# Patient Record
(demographics unavailable — no encounter records)

---

## 2025-03-10 NOTE — PHYSICAL EXAM
[Chaperone Present] : A chaperone was present in the examining room during all aspects of the physical examination [Normal] : normal [IUD String] : an IUD string was noted [FreeTextEntry2] : JAQUELINE Wilcox

## 2025-03-10 NOTE — HISTORY OF PRESENT ILLNESS
[IUD] : has an intrauterine device [Y] : Patient is sexually active [Monogamous (Male Partner)] : is monogamous with a male partner [FreeTextEntry1] : PATIENT PRESENT FOR IUD CHECK. [TextBox_4] : Pt present for IUD placement check [Mammogramdate] : 0 [BreastSonogramDate] : 0 [PapSmeardate] : 7/3/24 [BoneDensityDate] : 0 [ColonoscopyDate] : 0 [LMPDate] : 2/12/25 [[facility]] : [facility]

## 2025-03-10 NOTE — PLAN
[FreeTextEntry1] : IUD string visualized at cervical os; will have sono at next visit in July, 2025 D/W Dr Myers

## 2025-07-25 NOTE — COUNSELING
[Nutrition/ Exercise/ Weight Management] : nutrition, exercise, weight management [Vitamins/Supplements] : vitamins/supplements [Contraception/ Emergency Contraception/ Safe Sexual Practices] : contraception, emergency contraception, safe sexual practices [Yes] : Risk of tobacco use and health benefits of smoking cessation discussed: Yes

## 2025-07-25 NOTE — PHYSICAL EXAM
[Chaperoned Physical Exam] : A chaperone was present in the examining room during all aspects of the physical examination. [MA] : MA [FreeTextEntry1] : Joanna [Appropriately responsive] : appropriately responsive [Alert] : alert [No Acute Distress] : no acute distress [No Lymphadenopathy] : no lymphadenopathy [Regular Rate Rhythm] : regular rate rhythm [No Murmurs] : no murmurs [Clear to Auscultation B/L] : clear to auscultation bilaterally [Soft] : soft [Non-tender] : non-tender [Non-distended] : non-distended [No HSM] : No HSM [No Lesions] : no lesions [No Mass] : no mass [Oriented x3] : oriented x3 [Examination Of The Breasts] : a normal appearance [No Masses] : no breast masses were palpable [Labia Majora] : normal [Labia Minora] : normal [IUD String] : an IUD string was noted [Normal] : normal [Uterine Adnexae] : normal

## 2025-07-25 NOTE — PLAN
[FreeTextEntry1] : ANNUAL  PAP HPV GC CHL IUD IN PLACE  VITAMINS QD C/D/E HEALTHY DIET D/W/P RTO 12M

## 2025-07-25 NOTE — HISTORY OF PRESENT ILLNESS
[N] : Patient reports normal menses [Y] : Positive pregnancy history [FreeTextEntry1] : PATIENT PRESENT FOR ANNUAL EXAM/ IUD CHECK. [TextBox_4] : 32 year old female pt present for annual exam. Surgeries include knee surgery and hernia repair.  X 1. Family h/o diabetes hypertension and fibrosis of the lung.    [Mammogramdate] : 0 [BreastSonogramDate] : 0 [PapSmeardate] : 7/5/24 [BoneDensityDate] : 0 [ColonoscopyDate] : 0 [LMPDate] : 6/12/25 [PGxTotal] : 1 [[facility]